# Patient Record
Sex: FEMALE | Race: BLACK OR AFRICAN AMERICAN | NOT HISPANIC OR LATINO | Employment: FULL TIME | URBAN - NONMETROPOLITAN AREA
[De-identification: names, ages, dates, MRNs, and addresses within clinical notes are randomized per-mention and may not be internally consistent; named-entity substitution may affect disease eponyms.]

---
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
## 2022-11-13 ENCOUNTER — HOSPITAL ENCOUNTER (EMERGENCY)
Facility: HOSPITAL | Age: 22
Discharge: HOME | End: 2022-11-13
Attending: STUDENT IN AN ORGANIZED HEALTH CARE EDUCATION/TRAINING PROGRAM

## 2022-11-13 VITALS
OXYGEN SATURATION: 100 % | SYSTOLIC BLOOD PRESSURE: 157 MMHG | HEART RATE: 89 BPM | RESPIRATION RATE: 18 BRPM | WEIGHT: 245 LBS | DIASTOLIC BLOOD PRESSURE: 89 MMHG | TEMPERATURE: 99 F

## 2022-11-13 DIAGNOSIS — J06.9 UPPER RESPIRATORY TRACT INFECTION, UNSPECIFIED TYPE: Primary | ICD-10-CM

## 2022-11-13 LAB
B-HCG UR QL: NEGATIVE
CTP QC/QA: YES
FLUAV AG UPPER RESP QL IA.RAPID: NOT DETECTED
FLUBV AG UPPER RESP QL IA.RAPID: NOT DETECTED
SARS-COV-2 RNA RESP QL NAA+PROBE: NOT DETECTED
STREP A PCR (OHS): NOT DETECTED

## 2022-11-13 PROCEDURE — 87651 STREP A DNA AMP PROBE: CPT | Performed by: NURSE PRACTITIONER

## 2022-11-13 PROCEDURE — 81025 URINE PREGNANCY TEST: CPT | Performed by: NURSE PRACTITIONER

## 2022-11-13 PROCEDURE — 99282 EMERGENCY DEPT VISIT SF MDM: CPT | Mod: 25

## 2022-11-13 PROCEDURE — 0240U COVID/FLU A&B PCR: CPT | Performed by: NURSE PRACTITIONER

## 2022-11-13 NOTE — ED PROVIDER NOTES
Encounter Date: 11/13/2022       History     Chief Complaint   Patient presents with    Sore Throat      X2 days, hoarseness onset today, afebrile     The patient presents with occas nonprod cough, sore throat, nasal congestion, subjective fever, no cp or sob, no known covid-19 exposure.  The onset was 2  days ago.  The course/duration of symptoms is constant.  The degree at present is minimal.  The exacerbating factor is none.  The relieving factor is none.  Risk factors consist of none.  Prior episodes: occasional.  Associated symptoms: dry cough, nasal congestion, rhinorrhea, sore throat, denies chest pain and denies shortness of breath.  Additional history: none.         Review of patient's allergies indicates:   Allergen Reactions    Penicillins      History reviewed. No pertinent past medical history.  History reviewed. No pertinent surgical history.  History reviewed. No pertinent family history.  Social History     Tobacco Use    Smoking status: Unknown     Review of Systems   Constitutional:  Positive for fever.   HENT:  Positive for congestion and sore throat.    Respiratory:  Positive for cough. Negative for shortness of breath.    Cardiovascular:  Negative for chest pain.   Gastrointestinal:  Negative for nausea.   Genitourinary:  Negative for dysuria.   Musculoskeletal:  Negative for back pain.   Skin:  Negative for rash.   Neurological:  Negative for weakness.   Hematological:  Does not bruise/bleed easily.   All other systems reviewed and are negative.    Physical Exam     Initial Vitals [11/13/22 1452]   BP Pulse Resp Temp SpO2   (!) 178/101 87 18 98.7 °F (37.1 °C) 100 %      MAP       --         Physical Exam    Nursing note and vitals reviewed.  Constitutional: She appears well-developed and well-nourished.   HENT:   Head: Normocephalic and atraumatic.   Right Ear: Tympanic membrane normal.   Left Ear: Tympanic membrane normal.   Nose: Nose normal.   Mouth/Throat: Uvula is midline, oropharynx is  clear and moist and mucous membranes are normal.   Neck: Neck supple.   Normal range of motion.  Cardiovascular:  Normal rate, regular rhythm, normal heart sounds and intact distal pulses.           Pulmonary/Chest: Breath sounds normal.   Abdominal: Abdomen is soft. Bowel sounds are normal.   Musculoskeletal:         General: Normal range of motion.      Cervical back: Normal range of motion and neck supple.     Neurological: She is alert. She has normal strength.   Skin: Skin is warm and dry.   Psychiatric: She has a normal mood and affect.       ED Course   Procedures  Labs Reviewed   STREP GROUP A BY PCR - Normal    Narrative:     The Xpert Xpress Strep A test is a rapid, qualitative in vitro diagnostic test for the detection of Streptococcus pyogenes (Group A ß-hemolytic Streptococcus, Strep A) in throat swab specimens from patients with signs and symptoms of pharyngitis.     COVID/FLU A&B PCR - Normal    Narrative:     The Xpert Xpress SARS-CoV-2/FLU/RSV plus is a rapid, multiplexed real-time PCR test intended for the simultaneous qualitative detection and differentiation of SARS-CoV-2, Influenza A, Influenza B, and respiratory syncytial virus (RSV) viral RNA in either nasopharyngeal swab or nasal swab specimens.         POCT URINE PREGNANCY          Imaging Results    None          Medications - No data to display  Medical Decision Making:   History:   Old Records Summarized: records from clinic visits and records from previous admission(s).  Clinical Tests:   Lab Tests: Ordered and Reviewed  4:35 PM DISPOSITION: The patient is resting comfortably in no acute distress.  She is hemodynamically stable and is without objective evidence for acute process requiring urgent intervention or hospitalization. I provided counseling to patient with regard to condition, the treatment plan, specific conditions for return, and the importance of follow up. Detailed written and verbal instructions provided to patient and she  expressed a verbal understanding of the discharge instructions and overall management plan. Reiterated the importance of medication administration and safety and advised patient to follow up with primary care provider in 3-5 days or sooner if needed.  Answered questions at this time. The patient is stable for discharge.        APC / Resident Notes:   I was not physically present during the history, examine or disposition of this patient.  I was available all times for consultation. (Sita)                    Clinical Impression:   Final diagnoses:  [J06.9] Upper respiratory tract infection, unspecified type (Primary)      ED Disposition Condition    Discharge Stable          ED Prescriptions    None       Follow-up Information       Follow up With Specialties Details Why Contact Info    follow up with your pcp in 3-5 days        [facility] Emergency Medicine  If symptoms worsen [address] [facility] [id number] [phone]             LONNY Yeboah  11/13/22 5688       Ruslan Buckner MD  11/13/22 7232

## 2022-11-13 NOTE — Clinical Note
"Sarah Ro"Simba was seen and treated in our emergency department on 11/13/2022.  She may return to work on 11/16/2022.       If you have any questions or concerns, please don't hesitate to call.      SHAHIDA Akins RN    "

## 2022-11-15 ENCOUNTER — HOSPITAL ENCOUNTER (EMERGENCY)
Facility: HOSPITAL | Age: 22
Discharge: HOME | End: 2022-11-15
Attending: FAMILY MEDICINE

## 2022-11-15 VITALS
RESPIRATION RATE: 16 BRPM | OXYGEN SATURATION: 100 % | DIASTOLIC BLOOD PRESSURE: 90 MMHG | BODY MASS INDEX: 42.1 KG/M2 | SYSTOLIC BLOOD PRESSURE: 150 MMHG | WEIGHT: 277.75 LBS | TEMPERATURE: 98 F | HEIGHT: 68 IN | HEART RATE: 84 BPM

## 2022-11-15 DIAGNOSIS — U07.1 COVID-19: Primary | ICD-10-CM

## 2022-11-15 LAB
FLUAV AG UPPER RESP QL IA.RAPID: NOT DETECTED
FLUBV AG UPPER RESP QL IA.RAPID: NOT DETECTED
SARS-COV-2 RNA RESP QL NAA+PROBE: DETECTED
STREP A PCR (OHS): NOT DETECTED

## 2022-11-15 PROCEDURE — 0240U COVID/FLU A&B PCR: CPT | Performed by: PHYSICIAN ASSISTANT

## 2022-11-15 PROCEDURE — 87651 STREP A DNA AMP PROBE: CPT | Performed by: PHYSICIAN ASSISTANT

## 2022-11-15 PROCEDURE — 99283 EMERGENCY DEPT VISIT LOW MDM: CPT

## 2022-11-15 RX ORDER — METHYLPREDNISOLONE 4 MG/1
TABLET ORAL
Qty: 21 TABLET | Refills: 0 | OUTPATIENT
Start: 2022-11-15 | End: 2022-11-15 | Stop reason: SDUPTHER

## 2022-11-15 RX ORDER — METHYLPREDNISOLONE 4 MG/1
TABLET ORAL
Qty: 21 TABLET | Refills: 0 | Status: SHIPPED | OUTPATIENT
Start: 2022-11-15

## 2022-11-15 RX ORDER — METHYLPREDNISOLONE 4 MG/1
TABLET ORAL
Qty: 21 TABLET | Refills: 0 | OUTPATIENT
Start: 2022-11-15 | End: 2022-11-15

## 2022-11-15 NOTE — ED PROVIDER NOTES
Encounter Date: 11/15/2022       History     Chief Complaint   Patient presents with    SINUS CONGESTION     RECENT FLU AND COVID EXPOSURE,  CO SINUS CONGESTION AND SORE THROAT X 4 DAYS.  NO FEVER.       21 yo F w/ PMHx significant for HTN presents to ED c/o 4 day hx of congestion, rhinorrhea & mild cough following known exposure to COVID & flu. Patient was seen in this ED 2 days ago w/ similar symptoms, but also had sore throat & hoarseness at that time. Swabs for COVID, flu & strep all negative 2 days ago. Reports sore throat & hoarseness have resolved, but other symptoms remain unchanged. States she was told to return for repeat testing if symptoms were still present. Taking OTC meds. VSS on arrival w/ NAD.    Review of patient's allergies indicates:   Allergen Reactions    Penicillins      Past Medical History:   Diagnosis Date    Hypertension      No past surgical history on file.  No family history on file.  Social History     Tobacco Use    Smoking status: Never    Smokeless tobacco: Never     Review of Systems   Constitutional:  Positive for fatigue. Negative for appetite change, chills, fever and unexpected weight change.   HENT:  Positive for congestion and rhinorrhea. Negative for ear discharge, ear pain, sinus pressure, sinus pain and sore throat.    Eyes:  Negative for visual disturbance.   Respiratory:  Positive for cough. Negative for shortness of breath, wheezing and stridor.    Cardiovascular:  Negative for chest pain, palpitations and leg swelling.   Gastrointestinal:  Negative for abdominal pain, diarrhea, nausea and vomiting.   Genitourinary:  Negative for dysuria and hematuria.   Musculoskeletal:  Negative for arthralgias, back pain, myalgias and neck stiffness.   Skin:  Negative for pallor and rash.   Allergic/Immunologic: Negative for immunocompromised state.   Neurological:  Negative for dizziness, syncope and headaches.   Hematological:  Negative for adenopathy.   Psychiatric/Behavioral:   Negative for confusion.    All other systems reviewed and are negative.    Physical Exam     Initial Vitals [11/15/22 0949]   BP Pulse Resp Temp SpO2   (!) 150/90 84 16 97.9 °F (36.6 °C) 100 %      MAP       --         Physical Exam    Nursing note and vitals reviewed.  Constitutional: She appears well-developed and well-nourished. She is not diaphoretic. No distress.   HENT:   Head: Normocephalic and atraumatic.   Right Ear: Hearing, tympanic membrane, external ear and ear canal normal.   Left Ear: Hearing, tympanic membrane, external ear and ear canal normal.   Nose: Nose normal.   Mouth/Throat: Uvula is midline, oropharynx is clear and moist and mucous membranes are normal. No trismus in the jaw. No uvula swelling. No oropharyngeal exudate, posterior oropharyngeal edema or posterior oropharyngeal erythema.   Eyes: Conjunctivae and EOM are normal. Pupils are equal, round, and reactive to light.   Neck: Neck supple. No tracheal deviation present. No JVD present.   Normal range of motion.  Cardiovascular:  Normal rate, regular rhythm, normal heart sounds and intact distal pulses.     Exam reveals no gallop and no friction rub.       No murmur heard.  Pulmonary/Chest: Breath sounds normal. No stridor. No respiratory distress. She has no wheezes. She has no rhonchi. She has no rales.   Abdominal: Abdomen is soft. Bowel sounds are normal. She exhibits no distension and no mass. There is no abdominal tenderness. There is no rebound and no guarding.   Musculoskeletal:         General: No tenderness or edema. Normal range of motion.      Cervical back: Normal range of motion and neck supple.     Lymphadenopathy:     She has no cervical adenopathy.   Neurological: She is alert and oriented to person, place, and time. She has normal strength. No cranial nerve deficit or sensory deficit.   Skin: Skin is warm and dry. Capillary refill takes less than 2 seconds. No rash noted. No erythema. No pallor.   Psychiatric: She has a  normal mood and affect. Thought content normal.       ED Course   Procedures  Labs Reviewed   COVID/FLU A&B PCR - Abnormal; Notable for the following components:       Result Value    SARS-CoV-2 PCR Detected (*)     All other components within normal limits    Narrative:     The Xpert Xpress SARS-CoV-2/FLU/RSV plus is a rapid, multiplexed real-time PCR test intended for the simultaneous qualitative detection and differentiation of SARS-CoV-2, Influenza A, Influenza B, and respiratory syncytial virus (RSV) viral RNA in either nasopharyngeal swab or nasal swab specimens.         STREP GROUP A BY PCR - Normal    Narrative:     The Xpert Xpress Strep A test is a rapid, qualitative in vitro diagnostic test for the detection of Streptococcus pyogenes (Group A ß-hemolytic Streptococcus, Strep A) in throat swab specimens from patients with signs and symptoms of pharyngitis.            Imaging Results    None          Medications - No data to display  Medical Decision Making:   Clinical Tests:   Lab Tests: Ordered and Reviewed  Patient is COVID positive. Will discharge w/ medrol dosepak & instructed to continue taking OTC meds as directed for symptomatic treatment. Instructed to follow-up w/ PCP. ED precautions given.                        Clinical Impression:   Final diagnoses:  [U07.1] COVID-19 (Primary)      ED Disposition Condition    Discharge Stable          ED Prescriptions       Medication Sig Dispense Start Date End Date Auth. Provider    methylPREDNISolone (MEDROL DOSEPACK) 4 mg tablet  (Status: Discontinued) Take all tablets as directed on packaging 21 tablet 11/15/2022 11/15/2022 AIDAN Caro    methylPREDNISolone (MEDROL DOSEPACK) 4 mg tablet  (Status: Discontinued) Take all tablets as directed on packaging 21 tablet 11/15/2022 11/15/2022 AIDAN Caro    methylPREDNISolone (MEDROL DOSEPACK) 4 mg tablet  (Status: Discontinued) Take all tablets as directed on packaging 21 tablet 11/15/2022 11/15/2022  AIDAN Caro    methylPREDNISolone (MEDROL DOSEPACK) 4 mg tablet Take all tablets as directed on packaging 21 tablet 11/15/2022 -- AIDAN Caro          Follow-up Information       Follow up With Specialties Details Why Contact Info    PCP  Call in 3 days      [facility] Emergency Medicine  If symptoms worsen [address] [facility] [id number] [phone]             AIDAN Caro  11/15/22 1220       AIDAN Caro  12/02/22 2048

## 2022-11-15 NOTE — Clinical Note
"Sarah"Silas Cottrell was seen and treated in our emergency department on 11/15/2022.     COVID-19 is present in our communities across the state. There is limited testing for COVID at this time, so not all patients can be tested. In this situation, your employee meets the following criteria:    Sarah Cottrell has met the criteria for COVID-19 testing and has a POSITIVE result. She can return to work once they are asymptomatic for 24 hours without the use of fever reducing medications AND at least five days from the first positive result. A mask is recommended for 5 days post quarantine.     If you have any questions or concerns, or if I can be of further assistance, please do not hesitate to contact me.    Sincerely,             AIDAN Caro"

## 2022-11-15 NOTE — DISCHARGE INSTRUCTIONS
Report to Emergency Department if symptoms return or worsen; [facility] Within 1 to 2 days, It is important that you follow up with your primary care provider or specialist if indicated for further evaluation, workup, and treatment as necessary. The exam and treatment you received in Emergency Department was for an urgent problem and NOT INTENDED AS COMPLETE CARE. It is important that you FOLLOW UP with a doctor for ongoing care. If your symptoms become WORSE or you DO NOT IMPROVE and you are unable to reach your health care provider, you should RETURN to the Emergency Department. The Emergency Department provider has provided a PRELIMINARY INTERPRETATION of all your studies. A final interpretation may be done after you are discharged. If a change in your diagnosis or treatment is needed WE WILL CONTACT YOU. It is critical that we have a CURRENT PHONE NUMBER FOR YOU.

## 2024-06-26 ENCOUNTER — HOSPITAL ENCOUNTER (EMERGENCY)
Facility: HOSPITAL | Age: 24
Discharge: HOME | End: 2024-06-26
Attending: INTERNAL MEDICINE
Payer: COMMERCIAL

## 2024-06-26 VITALS
SYSTOLIC BLOOD PRESSURE: 165 MMHG | HEIGHT: 66 IN | TEMPERATURE: 98 F | WEIGHT: 260.69 LBS | OXYGEN SATURATION: 99 % | HEART RATE: 93 BPM | BODY MASS INDEX: 41.9 KG/M2 | RESPIRATION RATE: 18 BRPM | DIASTOLIC BLOOD PRESSURE: 104 MMHG

## 2024-06-26 DIAGNOSIS — F41.9 ANXIETY: Primary | ICD-10-CM

## 2024-06-26 DIAGNOSIS — R07.89 ATYPICAL CHEST PAIN: ICD-10-CM

## 2024-06-26 LAB
ALBUMIN SERPL-MCNC: 4 G/DL (ref 3.5–5)
ALBUMIN/GLOB SERPL: 0.9 RATIO (ref 1.1–2)
ALP SERPL-CCNC: 77 UNIT/L (ref 40–150)
ALT SERPL-CCNC: 19 UNIT/L (ref 0–55)
ANION GAP SERPL CALC-SCNC: 10 MEQ/L
AST SERPL-CCNC: 17 UNIT/L (ref 5–34)
B-HCG UR QL: NEGATIVE
BASOPHILS # BLD AUTO: 0.04 X10(3)/MCL
BASOPHILS NFR BLD AUTO: 0.4 %
BILIRUB SERPL-MCNC: 0.3 MG/DL
BNP BLD-MCNC: <10 PG/ML
BUN SERPL-MCNC: 11.9 MG/DL (ref 7–18.7)
CALCIUM SERPL-MCNC: 10 MG/DL (ref 8.4–10.2)
CHLORIDE SERPL-SCNC: 102 MMOL/L (ref 98–107)
CO2 SERPL-SCNC: 26 MMOL/L (ref 22–29)
CREAT SERPL-MCNC: 1.08 MG/DL (ref 0.55–1.02)
CREAT/UREA NIT SERPL: 11
CTP QC/QA: YES
EOSINOPHIL # BLD AUTO: 0.06 X10(3)/MCL (ref 0–0.9)
EOSINOPHIL NFR BLD AUTO: 0.6 %
ERYTHROCYTE [DISTWIDTH] IN BLOOD BY AUTOMATED COUNT: 12.8 % (ref 11.5–17)
GFR SERPLBLD CREATININE-BSD FMLA CKD-EPI: >60 ML/MIN/1.73/M2
GLOBULIN SER-MCNC: 4.3 GM/DL (ref 2.4–3.5)
GLUCOSE SERPL-MCNC: 93 MG/DL (ref 74–100)
HCT VFR BLD AUTO: 39.1 % (ref 37–47)
HGB BLD-MCNC: 13.5 G/DL (ref 12–16)
IMM GRANULOCYTES # BLD AUTO: 0.02 X10(3)/MCL (ref 0–0.04)
IMM GRANULOCYTES NFR BLD AUTO: 0.2 %
LYMPHOCYTES # BLD AUTO: 2.36 X10(3)/MCL (ref 0.6–4.6)
LYMPHOCYTES NFR BLD AUTO: 24.5 %
MCH RBC QN AUTO: 32.2 PG (ref 27–31)
MCHC RBC AUTO-ENTMCNC: 34.5 G/DL (ref 33–36)
MCV RBC AUTO: 93.3 FL (ref 80–94)
MONOCYTES # BLD AUTO: 0.56 X10(3)/MCL (ref 0.1–1.3)
MONOCYTES NFR BLD AUTO: 5.8 %
NEUTROPHILS # BLD AUTO: 6.61 X10(3)/MCL (ref 2.1–9.2)
NEUTROPHILS NFR BLD AUTO: 68.5 %
NRBC BLD AUTO-RTO: 0 %
PLATELET # BLD AUTO: 341 X10(3)/MCL (ref 130–400)
PMV BLD AUTO: 9.7 FL (ref 7.4–10.4)
POTASSIUM SERPL-SCNC: 3.6 MMOL/L (ref 3.5–5.1)
PROT SERPL-MCNC: 8.3 GM/DL (ref 6.4–8.3)
RBC # BLD AUTO: 4.19 X10(6)/MCL (ref 4.2–5.4)
SODIUM SERPL-SCNC: 138 MMOL/L (ref 136–145)
TROPONIN I SERPL-MCNC: <0.01 NG/ML (ref 0–0.04)
WBC # BLD AUTO: 9.65 X10(3)/MCL (ref 4.5–11.5)

## 2024-06-26 PROCEDURE — 85025 COMPLETE CBC W/AUTO DIFF WBC: CPT | Performed by: NURSE PRACTITIONER

## 2024-06-26 PROCEDURE — 83880 ASSAY OF NATRIURETIC PEPTIDE: CPT | Performed by: NURSE PRACTITIONER

## 2024-06-26 PROCEDURE — 80053 COMPREHEN METABOLIC PANEL: CPT | Performed by: NURSE PRACTITIONER

## 2024-06-26 PROCEDURE — 99285 EMERGENCY DEPT VISIT HI MDM: CPT | Mod: 25

## 2024-06-26 PROCEDURE — 81025 URINE PREGNANCY TEST: CPT | Performed by: NURSE PRACTITIONER

## 2024-06-26 PROCEDURE — 93005 ELECTROCARDIOGRAM TRACING: CPT

## 2024-06-26 PROCEDURE — 84484 ASSAY OF TROPONIN QUANT: CPT | Performed by: NURSE PRACTITIONER

## 2024-06-26 RX ORDER — AMLODIPINE BESYLATE 10 MG/1
1 TABLET ORAL DAILY
COMMUNITY

## 2024-06-26 RX ORDER — HYDROXYZINE PAMOATE 25 MG/1
25 CAPSULE ORAL EVERY 6 HOURS PRN
Qty: 15 CAPSULE | Refills: 0 | Status: SHIPPED | OUTPATIENT
Start: 2024-06-26

## 2024-06-27 LAB
OHS QRS DURATION: 102 MS
OHS QTC CALCULATION: 450 MS

## 2024-06-27 NOTE — ED PROVIDER NOTES
"Encounter Date: 6/26/2024       History     Chief Complaint   Patient presents with    Chest Pain     Patient reports one episode of sharp midsternal chest pain radiating to left upper back (8/10) followed by generalized numbness after standing up. She now reports chest "heaviness" (5/10). She reports only having a medical hx of hypertension which she takes meds for. Patient in no acute distress. EKG obtained in triage.     24 year old with history of hypertension and family history of diabetes c/o having chest pain today at work. She stated she took her blood pressure several times and it increased at every interval. She started getting nervous and then started having chest pains. She does have a history of having anxiety and is not taking medication for this at this time. Family history is only diabetes.    The history is provided by the patient. No  was used.     Review of patient's allergies indicates:   Allergen Reactions    Penicillins      Past Medical History:   Diagnosis Date    Hypertension      History reviewed. No pertinent surgical history.  No family history on file.  Social History     Tobacco Use    Smoking status: Never    Smokeless tobacco: Never     Review of Systems   Constitutional:  Negative for fever.   HENT:  Negative for sore throat.    Respiratory:  Negative for shortness of breath.    Cardiovascular:  Positive for chest pain.   Gastrointestinal:  Negative for nausea.   Genitourinary:  Negative for dysuria.   Musculoskeletal:  Negative for back pain.   Skin:  Negative for rash.   Neurological:  Negative for weakness.   Hematological:  Does not bruise/bleed easily.       Physical Exam     Initial Vitals [06/26/24 1919]   BP Pulse Resp Temp SpO2   (!) 165/104 93 18 98.2 °F (36.8 °C) 99 %      MAP       --         Physical Exam    Nursing note and vitals reviewed.  Constitutional: She appears well-developed and well-nourished.   HENT:   Head: Normocephalic and atraumatic. "   Eyes: Conjunctivae and EOM are normal. Pupils are equal, round, and reactive to light.   Neck: Neck supple.   Normal range of motion.  Cardiovascular:  Normal rate and regular rhythm.           Pulmonary/Chest: Breath sounds normal. No respiratory distress.   Abdominal: Abdomen is soft. Bowel sounds are normal. She exhibits no distension. There is no abdominal tenderness.   Musculoskeletal:         General: No edema. Normal range of motion.      Cervical back: Normal range of motion and neck supple.     Neurological: She is alert and oriented to person, place, and time. She has normal strength.   Skin: Skin is warm and dry.   Psychiatric: Thought content normal.         ED Course   Procedures  Labs Reviewed   COMPREHENSIVE METABOLIC PANEL - Abnormal; Notable for the following components:       Result Value    Creatinine 1.08 (*)     Globulin 4.3 (*)     Albumin/Globulin Ratio 0.9 (*)     All other components within normal limits   CBC WITH DIFFERENTIAL - Abnormal; Notable for the following components:    RBC 4.19 (*)     MCH 32.2 (*)     All other components within normal limits   TROPONIN I - Normal   B-TYPE NATRIURETIC PEPTIDE - Normal   CBC W/ AUTO DIFFERENTIAL    Narrative:     The following orders were created for panel order CBC auto differential.  Procedure                               Abnormality         Status                     ---------                               -----------         ------                     CBC with Differential[[id number]]       Abnormal            Final result                 Please view results for these tests on the individual orders.   POCT URINE PREGNANCY     EKG Readings: (Independently Interpreted)   Initial Reading: No STEMI. Rhythm: Normal Sinus Rhythm. Heart Rate: 84. Ectopy: No Ectopy. Conduction: Normal. ST Segments: Normal ST Segments. T Waves: Normal. Clinical Impression: Normal Sinus Rhythm       Imaging Results              X-Ray Chest PA And Lateral (Final  result)  Result time 06/26/24 20:38:13      Final result by Brennon Wells MD (06/26/24 20:38:13)                   Impression:      No acute cardiopulmonary process identified.      Electronically signed by: Brennon Wells  Date:    06/26/2024  Time:    20:38               Narrative:    EXAMINATION:  XR CHEST PA AND LATERAL    CLINICAL HISTORY:  Chest Pain;    TECHNIQUE:  Two-view    COMPARISON:  None available.    FINDINGS:  Cardiopericardial silhouette is within normal limits. Lungs are without dense focal or segmental consolidation, congestion, pleural effusion or pneumothorax.                                    X-Rays:   Independently Interpreted Readings:   Other Readings:  No acute cardiopulmonary process    Medications - No data to display  Medical Decision Making  CXR negative, labs reassuring. Patient with hypertension and anxiety. Will give vistaril for anxiety. Follow up with PCP. ER precautions given    Amount and/or Complexity of Data Reviewed  Labs: ordered. Decision-making details documented in ED Course.  Radiology: ordered. Decision-making details documented in ED Course.     Details: No acute cardiopulmonary process    Risk  Prescription drug management.  Risk Details: Risk Details: Given strict ED return precautions. I have spoken with the patient and/or caregivers. I have explained the patient's condition, diagnoses and treatment plan based on the information available to me at this time. I have answered the patient's and/or caregiver's questions and addressed any concerns. The patient and/or caregivers have as good an understanding of the patient's diagnosis, condition and treatment plan as can be expected at this point. The vital signs have been stable. The patient's condition is stable and appropriate for discharge from the emergency department.      The patient will pursue further outpatient evaluation with the primary care physician or other designated or consulting physician as outlined in  the discharge instructions. The patient and/or caregivers are agreeable to this plan of care and follow-up instructions have been explained in detail. The patient and/or caregivers have received these instructions in written format and have expressed an understanding of the discharge instructions. The patient and/or caregivers are aware that any significant change in condition or worsening of symptoms should prompt an immediate return to this or the closest emergency department or a call to 911.           Additional MDM:   Differential Diagnosis:   Miocardial infarction, pneumothorax, aortic dissection, pulmonary emboli, pneumonia, among others                             It is important that you follow up with your primary care provider or specialist if indicated for further evaluation, workup, and treatment as necessary. The exam and treatment you received in Emergency Department was for an urgent problem and NOT INTENDED AS COMPLETE CARE. It is important that you FOLLOW UP with a doctor for ongoing care. If your symptoms become WORSE or you DO NOT IMPROVE and you are unable to reach your health care provider, you should RETURN to the Emergency Department          Clinical Impression:  Final diagnoses:  [F41.9] Anxiety (Primary)  [R07.89] Atypical chest pain          ED Disposition Condition    Discharge Stable          ED Prescriptions       Medication Sig Dispense Start Date End Date Auth. Provider    hydrOXYzine pamoate (VISTARIL) 25 MG Cap Take 1 capsule (25 mg total) by mouth every 6 (six) hours as needed (anxiety). 15 capsule 6/26/2024 -- Steff Haile FNP          Follow-up Information       Follow up With Specialties Details Why Contact Info    follow up with PCP in 3-5 days  Schedule an appointment as soon as possible for a visit in 2 days               Steff Haile FNP  06/26/24 5350